# Patient Record
Sex: MALE | Race: OTHER | Employment: UNEMPLOYED | URBAN - METROPOLITAN AREA
[De-identification: names, ages, dates, MRNs, and addresses within clinical notes are randomized per-mention and may not be internally consistent; named-entity substitution may affect disease eponyms.]

---

## 2024-03-25 ENCOUNTER — HOSPITAL ENCOUNTER (EMERGENCY)
Facility: HOSPITAL | Age: 29
Discharge: HOME | End: 2024-03-25
Attending: EMERGENCY MEDICINE

## 2024-03-25 VITALS
SYSTOLIC BLOOD PRESSURE: 114 MMHG | TEMPERATURE: 98 F | OXYGEN SATURATION: 99 % | HEART RATE: 67 BPM | RESPIRATION RATE: 18 BRPM | DIASTOLIC BLOOD PRESSURE: 61 MMHG

## 2024-03-25 DIAGNOSIS — L28.2 PRURITIC RASH: Primary | ICD-10-CM

## 2024-03-25 LAB
ALBUMIN SERPL-MCNC: 4.5 G/DL (ref 3.2–4.8)
ALBUMIN/GLOB SERPL: 1.6 {RATIO} (ref 1–2)
ALP LIVER SERPL-CCNC: 101 U/L
ALT SERPL-CCNC: 28 U/L
ANION GAP SERPL CALC-SCNC: 7 MMOL/L (ref 0–18)
AST SERPL-CCNC: 26 U/L (ref ?–34)
BASOPHILS # BLD AUTO: 0.08 X10(3) UL (ref 0–0.2)
BASOPHILS NFR BLD AUTO: 1.2 %
BILIRUB SERPL-MCNC: 0.6 MG/DL (ref 0.3–1.2)
BUN BLD-MCNC: 17 MG/DL (ref 9–23)
BUN/CREAT SERPL: 16.8 (ref 10–20)
CALCIUM BLD-MCNC: 9.4 MG/DL (ref 8.7–10.4)
CHLORIDE SERPL-SCNC: 107 MMOL/L (ref 98–112)
CO2 SERPL-SCNC: 26 MMOL/L (ref 21–32)
CREAT BLD-MCNC: 1.01 MG/DL
DEPRECATED RDW RBC AUTO: 36.9 FL (ref 35.1–46.3)
EGFRCR SERPLBLD CKD-EPI 2021: 105 ML/MIN/1.73M2 (ref 60–?)
EOSINOPHIL # BLD AUTO: 0.19 X10(3) UL (ref 0–0.7)
EOSINOPHIL NFR BLD AUTO: 2.7 %
ERYTHROCYTE [DISTWIDTH] IN BLOOD BY AUTOMATED COUNT: 11.4 % (ref 11–15)
GLOBULIN PLAS-MCNC: 2.8 G/DL (ref 2.8–4.4)
GLUCOSE BLD-MCNC: 98 MG/DL (ref 70–99)
HCT VFR BLD AUTO: 44.5 %
HGB BLD-MCNC: 15.3 G/DL
IMM GRANULOCYTES # BLD AUTO: 0.03 X10(3) UL (ref 0–1)
IMM GRANULOCYTES NFR BLD: 0.4 %
LYMPHOCYTES # BLD AUTO: 1.89 X10(3) UL (ref 1–4)
LYMPHOCYTES NFR BLD AUTO: 27.3 %
MCH RBC QN AUTO: 30.8 PG (ref 26–34)
MCHC RBC AUTO-ENTMCNC: 34.4 G/DL (ref 31–37)
MCV RBC AUTO: 89.5 FL
MONOCYTES # BLD AUTO: 0.55 X10(3) UL (ref 0.1–1)
MONOCYTES NFR BLD AUTO: 7.9 %
NEUTROPHILS # BLD AUTO: 4.18 X10 (3) UL (ref 1.5–7.7)
NEUTROPHILS # BLD AUTO: 4.18 X10(3) UL (ref 1.5–7.7)
NEUTROPHILS NFR BLD AUTO: 60.5 %
OSMOLALITY SERPL CALC.SUM OF ELEC: 292 MOSM/KG (ref 275–295)
PLATELET # BLD AUTO: 231 10(3)UL (ref 150–450)
POTASSIUM SERPL-SCNC: 3.9 MMOL/L (ref 3.5–5.1)
PROT SERPL-MCNC: 7.3 G/DL (ref 5.7–8.2)
RBC # BLD AUTO: 4.97 X10(6)UL
SODIUM SERPL-SCNC: 140 MMOL/L (ref 136–145)
WBC # BLD AUTO: 6.9 X10(3) UL (ref 4–11)

## 2024-03-25 PROCEDURE — 96374 THER/PROPH/DIAG INJ IV PUSH: CPT

## 2024-03-25 PROCEDURE — 80053 COMPREHEN METABOLIC PANEL: CPT | Performed by: EMERGENCY MEDICINE

## 2024-03-25 PROCEDURE — 85025 COMPLETE CBC W/AUTO DIFF WBC: CPT | Performed by: EMERGENCY MEDICINE

## 2024-03-25 PROCEDURE — 99283 EMERGENCY DEPT VISIT LOW MDM: CPT

## 2024-03-25 PROCEDURE — 99284 EMERGENCY DEPT VISIT MOD MDM: CPT

## 2024-03-25 RX ORDER — DIPHENHYDRAMINE HYDROCHLORIDE 50 MG/ML
25 INJECTION INTRAMUSCULAR; INTRAVENOUS ONCE
Status: COMPLETED | OUTPATIENT
Start: 2024-03-25 | End: 2024-03-25

## 2024-03-25 RX ORDER — PREDNISONE 20 MG/1
40 TABLET ORAL DAILY
Qty: 10 TABLET | Refills: 0 | Status: SHIPPED | OUTPATIENT
Start: 2024-03-25 | End: 2024-03-30

## 2024-03-25 RX ORDER — DOXYCYCLINE HYCLATE 100 MG/1
100 CAPSULE ORAL ONCE
Status: COMPLETED | OUTPATIENT
Start: 2024-03-25 | End: 2024-03-25

## 2024-03-25 RX ORDER — DOXYCYCLINE HYCLATE 100 MG/1
100 CAPSULE ORAL 2 TIMES DAILY
Qty: 14 CAPSULE | Refills: 0 | Status: SHIPPED | OUTPATIENT
Start: 2024-03-25 | End: 2024-04-01

## 2024-03-25 RX ORDER — DIPHENHYDRAMINE HCL 25 MG
25 CAPSULE ORAL EVERY 6 HOURS PRN
Qty: 20 CAPSULE | Refills: 0 | Status: SHIPPED | OUTPATIENT
Start: 2024-03-25 | End: 2024-03-30

## 2024-03-25 NOTE — ED INITIAL ASSESSMENT (HPI)
Patient presents to ED with all over body rash and itching for 1 month   Obesity DM (diabetes mellitus)

## 2024-03-25 NOTE — ED PROVIDER NOTES
Patient Seen in: [facility] Emergency Department    History     Chief Complaint   Patient presents with    Rash     Stated Complaint: itching     HPI    29 yo M without PMH presenting with family for evaluation with one month of pruritic rash/dry skin now with intermittent erythema/bleeding from scratching. No fevers/chills, no nausea/vomiting. No throat/tongue/lip swelling. No dyspnea/wheezing. No new allergen exposures.    No past medical history on file.    No past surgical history on file.         No family history on file.    Social History     Socioeconomic History    Marital status: Single       Review of Systems :  Constitutional: As per HPI  Skin: (+) rash/itching.    Positive for stated complaint: itching  Other systems are as noted in HPI.  Constitutional and vital signs reviewed.      All other systems reviewed and negative except as noted above.    PSFH elements reviewed from today and agreed except as otherwise stated in HPI.    Physical Exam     ED Triage Vitals [03/25/24 1647]   /65   Pulse 72   Resp 20   Temp 98.1 °F (36.7 °C)   Temp src Temporal   SpO2 98 %   O2 Device None (Room air)       Current:/65   Pulse 72   Temp 98.1 °F (36.7 °C) (Temporal)   Resp 20   SpO2 98%         Physical Exam   Constitutional: No distress.   HEENT: MMM. No tongue/lip swelling, no uvular edema.  Head: Normocephalic.   Eyes: No injection.   Cardiovascular: RRR.   Pulmonary/Chest: Effort normal. CTAB.  Abdominal: Soft. Nontender.  Musculoskeletal: No gross deformity.  Neurological: Alert.   Skin: Skin is warm. Pruritic rash with mild concern for atopic dermatitis in setting of dry/scaly erythematous and excoriative papules with some lichenification without overt cellulitic change though with areas of warmth/erythema.  Psychiatric: Cooperative.  Nursing note and vitals reviewed.      ED Course     Labs Reviewed   COMP METABOLIC PANEL (14) - Normal   CBC WITH DIFFERENTIAL WITH PLATELET    Narrative:      The following orders were created for panel order CBC With Differential With Platelet.  Procedure                               Abnormality         Status                     ---------                               -----------         ------                     CBC W/ DIFFERENTIAL[[id number]]                              Final result                 Please view results for these tests on the individual orders.   CBC W/ DIFFERENTIAL      MDM   DIFFERENTIAL DIAGNOSIS: After history and physical exam differential diagnosis includes but is not limited to atopic dermatitis, allergic reaction, cellulitis.    Pulse ox: 98%:Normal on RA, as independently interpreted by myself    Medical Decision Making  Evaluation for subacute pruritic rash without obvious evidence for allergic reaction/urticaria of the left features concerning for atopic dermatitis and possible superimposed cellulitis in setting of excoriative change without constitutional symptoms.  Screening labs nonacute and specifically without heart failure or leukocytosis/leukopenia or eosinophilia -stable for discharge with symptomatic care including empiric antibiotics with oral steroids/topical steroids and primary care referral for follow-up.    Problems Addressed:  Pruritic rash: acute illness or injury    Amount and/or Complexity of Data Reviewed  Labs: ordered. Decision-making details documented in ED Course.    Risk  Prescription drug management.        I was wearing at minimum a facemask and eye protection throughout this encounter with handwashing performed prior and after patient evaluation without personal hand/facial/oropharyngeal contact and gloves worn throughout encounter. See note and/or contact this provider for further PPE details.    Disposition and Plan     Clinical Impression:  1. Pruritic rash        Disposition:  Discharge    Follow-up:  JACOB BROWN MD  [facility] [address] [address] [phone]  Call  For primary  care followup and re-evaluation.      Medications Prescribed:  Current Discharge Medication List        START taking these medications    Details   doxycycline 100 MG Oral Cap Take 1 capsule (100 mg total) by mouth 2 (two) times daily for 7 days.  Qty: 14 capsule, Refills: 0      diphenhydrAMINE 25 MG Oral Cap Take 1 capsule (25 mg total) by mouth every 6 (six) hours as needed for Itching.  Qty: 20 capsule, Refills: 0      predniSONE 20 MG Oral Tab Take 2 tablets (40 mg total) by mouth daily for 5 days.  Qty: 10 tablet, Refills: 0      hydrocortisone 1 % External Ointment Apply 1 Application topically 2 (two) times daily.  Qty: 56 g, Refills: 0

## 2025-03-11 NOTE — ED PROVIDER NOTES
Patient Seen in: [facility] Emergency Department      History     Chief Complaint   Patient presents with    Eval-G     Stated Complaint: Eval-G    Subjective:   28yo/m w no chronic medical problems reports w ~ 10 days of penile rash, stinging and burning. He was prescribed a 'soap' and cream for ?balanitis by a clinic. He had a negative gc, chl, trich, rpr, hiv, ua all done. No fever. Reports burning to lesions particularly near urethra              Objective:     History reviewed. No pertinent past medical history.           History reviewed. No pertinent surgical history.             Social History     Tobacco Use    Smoking status: Never    Smokeless tobacco: Never                  Physical Exam     ED Triage Vitals [03/11/25 1648]   /72   Pulse 80   Resp 18   Temp 98 °F (36.7 °C)   Temp src    SpO2 98 %   O2 Device        Current Vitals:   Vital Signs  BP: 122/72  Pulse: 80  Resp: 18  Temp: 98 °F (36.7 °C)    Oxygen Therapy  SpO2: 98 %        Physical Exam  Vitals and nursing note reviewed.   Constitutional:       General: He is not in acute distress.     Appearance: He is well-developed.   HENT:      Head: Normocephalic and atraumatic.      Nose: Nose normal.      Mouth/Throat:      Mouth: Mucous membranes are moist.   Eyes:      Conjunctiva/sclera: Conjunctivae normal.      Pupils: Pupils are equal, round, and reactive to light.   Cardiovascular:      Rate and Rhythm: Normal rate and regular rhythm.      Heart sounds: Normal heart sounds.   Pulmonary:      Effort: Pulmonary effort is normal.      Breath sounds: Normal breath sounds.   Abdominal:      General: Bowel sounds are normal.      Palpations: Abdomen is soft.   Genitourinary:     Comments: Vesicular rashes in two patches to glans c/w hsv  Musculoskeletal:         General: No tenderness or deformity. Normal range of motion.      Cervical back: Normal range of motion and neck supple.   Skin:     General: Skin is warm and dry.       Capillary Refill: Capillary refill takes less than 2 seconds.      Findings: No rash.      Comments: Normal color   Neurological:      General: No focal deficit present.      Mental Status: He is alert and oriented to person, place, and time.      GCS: GCS eye subscore is 4. GCS verbal subscore is 5. GCS motor subscore is 6.      Cranial Nerves: No cranial nerve deficit.      Gait: Gait normal.             ED Course     Labs Reviewed   HSV 1/2 SUBTYPE BY PCR (LESION-ONLY)                   MDM              Medical Decision Making  30yo/m w hx and exam as stated; rash/burn    Non toxic, well appearing  Afebrile  Hemodynamically stable  No vomiting  Tolerating po  No head, neck, back pain  No trauma    Plan  HSV swab  Valtrex  Pain control      Amount and/or Complexity of Data Reviewed  Labs:  Decision-making details documented in ED Course.    Risk  OTC drugs.  Prescription drug management.        Disposition and Plan     Clinical Impression:  1. Genital herpes simplex, unspecified site         Disposition:  Discharge  3/11/2025  5:49 pm    Follow-up:  Monik Feliz MD  [address] [address]  [phone]    Follow up in 2 day(s)            Medications Prescribed:  Current Discharge Medication List        START taking these medications    Details   valACYclovir 1 G Oral Tab Take 1 tablet (1,000 mg total) by mouth every 12 (twelve) hours for 10 days.  Qty: 20 tablet, Refills: 0    Associated Diagnoses: Genital herpes simplex, unspecified site      traMADol 50 MG Oral Tab Take 1-2 tablets ( mg total) by mouth every 6 (six) hours as needed for Pain.  Qty: 10 tablet, Refills: 0    Associated Diagnoses: Genital herpes simplex, unspecified site                 Supplementary Documentation:

## 2025-03-11 NOTE — ED INITIAL ASSESSMENT (HPI)
Patient complains of pain to his penis with bumps, states he has dysuria and state it hurts him to walk, states this has been going on for a month, states it worsened three weeks ago, was evaluated at the end of February and negative for STDs, states he was prescribed medicine but did not help

## 2025-03-19 NOTE — PROGRESS NOTES
[facility]  Part of [facility]  Urology Clinic Note    : Vicki [id number]    Today I had the pleasure of seeing Teddy Huber in my clinic. Mr. Grover Huber is a pleasant 29 year old male who I am seeing for penile rash. Patient was last seen in this department on Visit date not found.    Briefly, patient was seen in the ER on 3/11/2025 for approximately 10 days of penile rash, stinging, burning.  Reportedly, he was prescribed a \"soap\" and cream for ?balanitis by clinic.  Per ED chart review had negative gonorrhea, chlamydia, trichomoniasis, RPR, HIV, UA.  At that time he did report burning to the lesions particularly near the urethra.  Rash was described as vesicular rashes into patches of the glans consistent with HSV.  Did have further HSV testing, both 1 and 2 subtype negative. Also given empiric valacyclovir for 10 days.    He reports the symptoms have been occurring for approximately 2 months at this point. Began a couple days after have sexual activity. Presented as hives/bumps and gradually progressed to open lesions. He complains of tenderness when touching it but also when urine comes in contact. Denies discharge from the urethra. He does have a history of atopic dermatitis and thinks it may be this.       PAST MEDICAL HISTORY:  No past medical history on file.     PAST SURGICAL HISTORY:  No past surgical history on file.    ALLERGIES:  Allergies[1]      MEDICATIONS:  Current Outpatient Medications   Medication Instructions    valACYclovir (VALTREX) 1,000 mg, Oral, Every 12 hours        FAMILY HISTORY:  No family history on file.     SOCIAL HISTORY:  Social History     Socioeconomic History    Marital status: Single   Tobacco Use    Smoking status: Never    Smokeless tobacco: Never     Social Drivers of Health     Food Insecurity: Not on File (10/6/2022)    Received from [employer]    Food Chewse     Food: 0   Transportation Needs: Not on File (10/6/2022)    Received  from [facility]    Transportation Needs     Transportation: 0   Stress: Not on File (10/6/2022)    Received from [facility]    Stress     Stress: 0   Housing Stability: Not on File (10/6/2022)    Received from [facility]    Housing Stability     Housin          PHYSICAL EXAMINATION:  General: No acute distress, cooperative and communicative, alert and oriented  HEENT: Normocephalic, atraumatic, moist mucous membranes, no discharge, no gross neck abnormalities  Respiratory: No respiratory distress. Chest expansion equal bilaterally.  Penis: Multiple sites of erythema with vesicles, some of which appear to be unroofed.These are located on the foreskin overlying the glans and on the glans itself.    REVIEW OF SYSTEMS:  A comprehensive 10-point review of systems was completed.  Pertinent positives and negatives are noted in the the HPI.       LABORATORY DATA:  Component  Ref Range & Units 3/25/24  5:45 PM   Glucose  70 - 99 mg/dL 98   Sodium  136 - 145 mmol/L 140   Potassium  3.5 - 5.1 mmol/L 3.9   Chloride  98 - 112 mmol/L 107   CO2  21.0 - 32.0 mmol/L 26.0   Anion Gap  0 - 18 mmol/L 7   BUN  9 - 23 mg/dL 17   Creatinine  0.70 - 1.30 mg/dL 1.01   BUN/CREA Ratio  10.0 - 20.0 16.8   Calcium, Total  8.7 - 10.4 mg/dL 9.4   Calculated Osmolality  275 - 295 mOsm/kg 292   eGFR-Cr  >=60 mL/min/1.73m2 105     Component  Ref Range & Units 3/25/24  5:45 PM   WBC  4.0 - 11.0 x10(3) uL 6.9   RBC  4.30 - 5.70 x10(6)uL 4.97   HGB  13.0 - 17.5 g/dL 15.3   HCT  39.0 - 53.0 % 44.5           IMAGING REVIEW:  No relevant urologic imaging     OTHER RELEVANT DATA:   N/A     IMPRESSION:  #Penile rash  Discussed with CAREY. Appears to be herpes, but given his negative HSV 1 / 2 testing, unclear what it may be. Plan to treat empirically for chancroid, herpes, and fungal infection and refer to dermatology should it not improve.     PLAN:  - Lotrisone cream BID x 2 weeks alternatively clotrimazole cream twice daily for 2 weeks  - Restart valtrex to finish  course  - Azithromycin 1g PO x 1 dose  - If worsened or no improvement in the next 2-3 weeks, follow up with dermatology      René Hussein PA-C      The 21st Century Cures Act makes medical notes available to patients in the interest of transparency.  However, please be advised that this is a medical document.  It is intended as a peer to peer communication.  It is written in medical language and may contain abbreviations or verbiage that are technical and unfamiliar.  It may appear blunt or direct.  Medical documents are intended to carry relevant information, facts as evident, and the clinical opinion of the practitioner.         [1] No Known Allergies